# Patient Record
Sex: MALE
[De-identification: names, ages, dates, MRNs, and addresses within clinical notes are randomized per-mention and may not be internally consistent; named-entity substitution may affect disease eponyms.]

---

## 2019-03-27 ENCOUNTER — HOSPITAL ENCOUNTER (EMERGENCY)
Dept: HOSPITAL 97 - ER | Age: 24
Discharge: HOME | End: 2019-03-27
Payer: SELF-PAY

## 2019-03-27 VITALS — SYSTOLIC BLOOD PRESSURE: 127 MMHG | DIASTOLIC BLOOD PRESSURE: 88 MMHG | OXYGEN SATURATION: 99 %

## 2019-03-27 VITALS — TEMPERATURE: 98 F

## 2019-03-27 DIAGNOSIS — K62.5: ICD-10-CM

## 2019-03-27 DIAGNOSIS — F17.210: ICD-10-CM

## 2019-03-27 DIAGNOSIS — R10.9: Primary | ICD-10-CM

## 2019-03-27 LAB
ALBUMIN SERPL BCP-MCNC: 3.8 G/DL (ref 3.4–5)
ALP SERPL-CCNC: 77 U/L (ref 45–117)
ALT SERPL W P-5'-P-CCNC: 69 U/L (ref 12–78)
AST SERPL W P-5'-P-CCNC: 67 U/L (ref 15–37)
BUN BLD-MCNC: 14 MG/DL (ref 7–18)
GLUCOSE SERPLBLD-MCNC: 120 MG/DL (ref 74–106)
HCT VFR BLD CALC: 43.3 % (ref 39.6–49)
LIPASE SERPL-CCNC: 80 U/L (ref 73–393)
LYMPHOCYTES # SPEC AUTO: 1.6 K/UL (ref 0.7–4.9)
PMV BLD: 8.6 FL (ref 7.6–11.3)
POTASSIUM SERPL-SCNC: 3.9 MMOL/L (ref 3.5–5.1)
RBC # BLD: 4.74 M/UL (ref 4.33–5.43)

## 2019-03-27 PROCEDURE — 36415 COLL VENOUS BLD VENIPUNCTURE: CPT

## 2019-03-27 PROCEDURE — 80048 BASIC METABOLIC PNL TOTAL CA: CPT

## 2019-03-27 PROCEDURE — 99283 EMERGENCY DEPT VISIT LOW MDM: CPT

## 2019-03-27 PROCEDURE — 85025 COMPLETE CBC W/AUTO DIFF WBC: CPT

## 2019-03-27 PROCEDURE — 83690 ASSAY OF LIPASE: CPT

## 2019-03-27 PROCEDURE — 80076 HEPATIC FUNCTION PANEL: CPT

## 2019-03-27 PROCEDURE — 96360 HYDRATION IV INFUSION INIT: CPT

## 2019-03-27 NOTE — ER
Nurse's Notes                                                                                     

 [facility]                                                                 

Name: Edgardo Gardner Jr                                                                            

Age: 24 yrs                                                                                       

Sex: Male                                                                                         

: 1995                                                                                   

MRN: [id number]                                                                                   

Arrival Date: 2019                                                                          

Time: 07:46                                                                                       

Account#: [id number]                                                                            

Bed 6                                                                                             

Private MD:                                                                                       

Diagnosis: Unspecified abdominal pain                                                             

                                                                                                  

Presentation:                                                                                     

                                                                                             

07:53 Presenting complaint: Patient states: intermittent abd pain, cough, N/V and red blood   ss  

      in stool x 6 months that is not going away, but getting worse. Pt reports he is a daily     

      drinker, but has been trying to quit. Transition of care: patient was not received from     

      another setting of care. Onset of symptoms is unknown. Risk Assessment: Do you want to      

      hurt yourself or someone else? Patient reports no desire to harm self or others.            

      Initial Sepsis Screen: Does the patient meet any 2 criteria? No. Patient's initial          

      sepsis screen is negative. Does the patient have a suspected source of infection? No.       

      Patient's initial sepsis screen is negative. Care prior to arrival: None.                   

07:53 Method Of Arrival: Ambulatory                                                           ss  

07:53 Acuity: ANA 3                                                                           ss  

                                                                                                  

Historical:                                                                                       

- Allergies:                                                                                      

07:55 No Known Allergies;                                                                     ss  

- Home Meds:                                                                                      

07:55 None [Active];                                                                          ss  

- PMHx:                                                                                           

07:55 None;                                                                                   ss  

- PSHx:                                                                                           

07:55 R leg repair;                                                                           ss  

                                                                                                  

- Immunization history:: Adult Immunizations unknown.                                             

- Social history:: Smoking status: Patient uses tobacco products, smokes one-half pack            

  cigarettes per day, Patient uses alcohol, on a daily basis. "It was liquor, but it's            

  at least a 6 pack a day".                                                                       

- Ebola Screening: : Patient denies exposure to infectious person Patient denies travel           

  to an Ebola-affected area in the 21 days before illness onset.                                  

                                                                                                  

                                                                                                  

Screenin:10 Abuse screen: Denies threats or abuse. Denies injuries from another. Nutritional        ph  

      screening: No deficits noted. Tuberculosis screening: No symptoms or risk factors           

      identified. Fall Risk None identified.                                                      

                                                                                                  

Assessment:                                                                                       

08:00 General: Appears in no apparent distress. comfortable, obese, well groomed, Behavior is ph  

      calm, cooperative, appropriate for age. Pain: Complains of pain in abdomen. Neuro:          

      Level of Consciousness is awake, alert, obeys commands, Oriented to person, place,          

      time, situation. Cardiovascular: Denies chest pain, lightheadedness, shortness of           

      breath, Capillary refill < 3 seconds in bilateral fingers Patient's skin is warm and        

      dry. Respiratory: Airway is patent Respiratory effort is even, unlabored, Respiratory       

      pattern is regular, symmetrical. GI: Abdomen is round non-distended, Bowel sounds           

      present X 4 quads. Abd is soft and non tender X 4 quads. Reports lower abdominal pain,      

      upper abdominal pain, rectal bleeding, nausea, vomiting, Patient currently denies           

      constipation, diarrhea. Derm: Skin is intact, is healthy with good turgor, Skin is          

      pink, warm \T\ dry. Musculoskeletal: Circulation, motion, and sensation intact. Range of    

      motion: intact in all extremities.                                                          

09:30 Reassessment: Patient appears in no apparent distress at this time. Patient and/or      ph  

      family updated on plan of care and expected duration. Pain level reassessed. Patient is     

      alert, oriented x 3, equal unlabored respirations, skin warm/dry/pink. Pt d/c home w/       

      SO.                                                                                         

                                                                                                  

Vital Signs:                                                                                      

07:55  / 96; Pulse 82; Resp 18; Temp 98.0(TE); Pulse Ox 98% on R/A; Weight 108.86 kg;   ss  

      Height 6 ft. 0 in. (182.88 cm); Pain 0/10;                                                  

09:37  / 88; Pulse 78; Resp 18; Temp 98.0; Pulse Ox 99% on R/A;                         ph  

07:55 Body Mass Index 32.55 (108.86 kg, 182.88 cm)                                              

                                                                                                  

ED Course:                                                                                        

07:46 Patient arrived in ED.                                                                  tw3 

07:49 Les Raines NP is PHCP.                                                           pm1 

07:49 Lew Maldonado MD is Attending Physician.                                                    pm1 

07:54 Triage completed.                                                                       ss  

07:55 Arm band placed on right wrist.                                                         ss  

08:01 Inserted saline lock: 20 gauge in right antecubital area, using aseptic technique.      pc1 

08:10 Lesli Dietz, RN is Primary Nurse.                                                    ph  

08:11 Patient has correct armband on for positive identification. Bed in low position. Call   ph  

      light in reach. Side rails up X 1. Pulse ox on. NIBP on.                                    

08:26 Raul Plunkett MD is Attending Physician.                                             pm1 

09:37 No provider procedures requiring assistance completed. IV discontinued, intact,         ph  

      bleeding controlled, No redness/swelling at site. Pressure dressing applied.                

                                                                                                  

Administered Medications:                                                                         

08:12 Drug: NS 0.9% 1000 ml Route: IV; Rate: 1000 ml; Site: right antecubital;                ph  

09:25 Follow up: Response: No adverse reaction; IV Status: Completed infusion                 ph  

                                                                                                  

                                                                                                  

Outcome:                                                                                          

09:20 Discharge ordered by MD.                                                                pm1 

09:37 Discharged to home ambulatory, with significant other.                                  ph  

09:37 Condition: good                                                                             

09:37 Discharge instructions given to patient, Instructed on discharge instructions, follow       

      up and referral plans. Demonstrated understanding of instructions, follow-up care.          

09:38 Patient left the ED.                                                                    ph  

                                                                                                  

Signatures:                                                                                       

Magalys Fletcher RN                      RN                                                      

Lesli Dietz RN                      RN   ph                                                   

Les Raines, JOY                    NP   pm1                                                  

Agnes Flowers                                    3                                                  

Cantu, Patrick                               pc1                                                  

                                                                                                  

**************************************************************************************************

## 2019-03-27 NOTE — EDPHYS
Physician Documentation                                                                           

 [facility]                                                                 

Name: Edgardo Gardner Jr                                                                            

Age: 24 yrs                                                                                       

Sex: Male                                                                                         

: 1995                                                                                   

MRN: [id number]                                                                                   

Arrival Date: 2019                                                                          

Time: 07:46                                                                                       

Account#: [id number]                                                                            

Bed 6                                                                                             

Private MD:                                                                                       

ED Physician Raul Plunkett                                                                      

HPI:                                                                                              

                                                                                             

08:00 This 24 yrs old  Male presents to ER via Ambulatory with complaints of          pm1 

      Abdominal Pain.                                                                             

08:00 The patient presents with abdominal pain that is diffuse. Onset: The symptoms/episode   pm1 

      began/occurred 6 month(s) ago. The symptoms do not radiate. Associated signs and            

      symptoms: Pertinent positives: blood in stools, Pertinent negatives: nausea, vomiting,      

      and diarrhea. The symptoms are described as achy. Modifying factors: The symptoms are       

      alleviated by nothing, the symptoms are aggravated by nothing. Severity of pain: in the     

      emergency department the pain is unchanged. The patient has not recently seen a             

      physician. Patient with abdominal pain for 6 months. Reports occasional bright red          

      blood in his stools.                                                                        

                                                                                                  

Historical:                                                                                       

- Allergies:                                                                                      

07:55 No Known Allergies;                                                                     ss  

- Home Meds:                                                                                      

07:55 None [Active];                                                                          ss  

- PMHx:                                                                                           

07:55 None;                                                                                   ss  

- PSHx:                                                                                           

07:55 R leg repair;                                                                           ss  

                                                                                                  

- Immunization history:: Adult Immunizations unknown.                                             

- Social history:: Smoking status: Patient uses tobacco products, smokes one-half pack            

  cigarettes per day, Patient uses alcohol, on a daily basis. "It was liquor, but it's            

  at least a 6 pack a day".                                                                       

- Ebola Screening: : Patient denies exposure to infectious person Patient denies travel           

  to an Ebola-affected area in the 21 days before illness onset.                                  

                                                                                                  

                                                                                                  

ROS:                                                                                              

08:00 Constitutional: Negative for fever, chills, and weight loss, Eyes: Negative for injury, pm1 

      pain, redness, and discharge, ENT: Negative for injury, pain, and discharge, Neck:          

      Negative for injury, pain, and swelling, Cardiovascular: Negative for chest pain,           

      palpitations, and edema, Respiratory: Negative for shortness of breath, cough,              

      wheezing, and pleuritic chest pain.                                                         

08:00 Back: Negative for injury and pain, : Negative for injury, bleeding, discharge, and       

      swelling, MS/Extremity: Negative for injury and deformity, Skin: Negative for injury,       

      rash, and discoloration, Neuro: Negative for headache, weakness, numbness, tingling,        

      and seizure.                                                                                

08:00 Abdomen/GI: Positive for abdominal pain, rectal bleeding, Negative for nausea,              

      vomiting, and diarrhea, constipation.                                                       

                                                                                                  

Exam:                                                                                             

08:00 Constitutional:  This is a well developed, well nourished patient who is awake, alert,  pm1 

      and in no acute distress. Head/Face:  Normocephalic, atraumatic. Eyes:  Pupils equal        

      round and reactive to light, extra-ocular motions intact.  Lids and lashes normal.          

      Conjunctiva and sclera are non-icteric and not injected.  Cornea within normal limits.      

      Periorbital areas with no swelling, redness, or edema. ENT:  Nares patent. No nasal         

      discharge, no septal abnormalities noted.  Tympanic membranes are normal and external       

      auditory canals are clear.  Oropharynx with no redness, swelling, or masses, exudates,      

      or evidence of obstruction, uvula midline.  Mucous membranes moist. Neck:  Trachea          

      midline, no thyromegaly or masses palpated, and no cervical lymphadenopathy.  Supple,       

      full range of motion without nuchal rigidity, or vertebral point tenderness.  No            

      Meningismus. Chest/axilla:  Normal chest wall appearance and motion.  Nontender with no     

      deformity.  No lesions are appreciated. Cardiovascular:  Regular rate and rhythm with a     

      normal S1 and S2.  No gallops, murmurs, or rubs.  Normal PMI, no JVD.  No pulse             

      deficits. Respiratory:  Lungs have equal breath sounds bilaterally, clear to                

      auscultation and percussion.  No rales, rhonchi or wheezes noted.  No increased work of     

      breathing, no retractions or nasal flaring.                                                 

08:00 Back:  No spinal tenderness.  No costovertebral tenderness.  Full range of motion.          

      Skin:  Warm, dry with normal turgor.  Normal color with no rashes, no lesions, and no       

      evidence of cellulitis. MS/ Extremity:  Pulses equal, no cyanosis.  Neurovascular           

      intact.  Full, normal range of motion.                                                      

08:00 Abdomen/GI: Inspection: abdomen appears normal, Bowel sounds: normal, Palpation:            

      abdomen is soft and non-tender, in all quadrants, mass, is not appreciated, rebound         

      tenderness, is not appreciated.                                                             

08:00 Neuro: Orientation: is normal, Motor: is normal, moves all fours.                           

09:16 Abdomen/GI: Rectal exam: Patient refused examination.                                   pm1 

                                                                                                  

Vital Signs:                                                                                      

07:55  / 96; Pulse 82; Resp 18; Temp 98.0(TE); Pulse Ox 98% on R/A; Weight 108.86 kg;   ss  

      Height 6 ft. 0 in. (182.88 cm); Pain 0/10;                                                  

09:37  / 88; Pulse 78; Resp 18; Temp 98.0; Pulse Ox 99% on R/A;                         ph  

07:55 Body Mass Index 32.55 (108.86 kg, 182.88 cm)                                            ss  

                                                                                                  

MDM:                                                                                              

07:54 Patient medically screened.                                                             pm1 

09:16 Data reviewed: vital signs. Data interpreted: Pulse oximetry: on room air is 98 %.      pm1 

      Interpretation: normal.                                                                     

09:16 ED course: Patient did not want examination of rectum for complaint of some occasional  pm1 

      bright red blood in his stool for the past 6 months.                                        

09:16 Counseling: I had a detailed discussion with the patient and/or guardian regarding: the pm1 

      historical points, exam findings, and any diagnostic results supporting the                 

      discharge/admit diagnosis, lab results, the need for outpatient follow up, to return to     

      the emergency department if symptoms worsen or persist or if there are any questions or     

      concerns that arise at home, Drinking alcohol cessation.                                    

                                                                                                  

                                                                                             

07:56 Order name: Basic Metabolic Panel; Complete Time: 09:02                                 pm1 

                                                                                             

07:56 Order name: CBC with Diff; Complete Time: 08:24                                         pm1 

                                                                                             

07:56 Order name: Creatinine for Radiology; Complete Time: 08:36                              pm1 

                                                                                             

07:56 Order name: Hepatic Function; Complete Time: 09:02                                      pm1 

                                                                                             

07:56 Order name: Lipase; Complete Time: 09:02                                                pm1 

                                                                                             

07:56 Order name: IV Saline Lock; Complete Time: 08:11                                        pm1 

                                                                                             

07:56 Order name: Labs collected and sent; Complete Time: 08:12                               pm1 

                                                                                                  

Administered Medications:                                                                         

08:12 Drug: NS 0.9% 1000 ml Route: IV; Rate: 1000 ml; Site: right antecubital;                ph  

09:25 Follow up: Response: No adverse reaction; IV Status: Completed infusion                 ph  

                                                                                                  

                                                                                                  

Disposition:                                                                                      

                                                                                             

06:51 Co-signature as Attending Physician, Raul Plunkett MD I agree with the assessment and  wa  

      plan of care.                                                                               

                                                                                                  

Disposition:                                                                                      

19 09:20 Discharged to Home. Impression: Unspecified abdominal pain.                        

- Condition is Stable.                                                                            

- Discharge Instructions: Abdominal Pain, Adult.                                                  

                                                                                                  

- Medication Reconciliation Form, Thank You Letter, Antibiotic Education, Prescription            

  Opioid Use form.                                                                                

- Follow up: Emergency Department; When: As needed; Reason: Worsening of condition.               

  Follow up: Private Physician; When: 2 - 3 days; Reason: Recheck today's complaints,             

  Continuance of care, Re-evaluation by your physician.                                           

- Problem is new.                                                                                 

- Symptoms have improved.                                                                         

                                                                                                  

                                                                                                  

                                                                                                  

Signatures:                                                                                       

Dispatcher [employer]                           EDMS                                                 

Magalys Fletcher RN                      RN   ss                                                   

Lesli Dietz RN                      RN   ph                                                   

Les Raines, NP                    NP   pm1                                                  

Raul Plunkett MD MD wa                                                   

                                                                                                  

Corrections: (The following items were deleted from the chart)                                    

                                                                                             

09:38 09:20 2019 09:20 Discharged to Home. Impression: Unspecified abdominal pain.      ph  

      Condition is Stable. Forms are Medication Reconciliation Form, Thank You Letter,            

      Antibiotic Education, Prescription Opioid Use. Follow up: Emergency Department; When:       

      As needed; Reason: Worsening of condition. Follow up: Private Physician; When: 2 - 3        

      days; Reason: Recheck today's complaints, Continuance of care, Re-evaluation by your        

      physician. Problem is new. Symptoms have improved. pm1                                      

                                                                                                  

**************************************************************************************************

## 2025-04-13 ENCOUNTER — HOSPITAL ENCOUNTER (EMERGENCY)
Dept: HOSPITAL 97 - ER | Age: 30
Discharge: HOME | End: 2025-04-13
Payer: SELF-PAY

## 2025-04-13 VITALS — DIASTOLIC BLOOD PRESSURE: 83 MMHG | OXYGEN SATURATION: 100 % | SYSTOLIC BLOOD PRESSURE: 132 MMHG

## 2025-04-13 DIAGNOSIS — F41.9: Primary | ICD-10-CM

## 2025-04-13 LAB
ALBUMIN SERPL BCP-MCNC: 3.6 G/DL (ref 3.4–5)
ALBUMIN/GLOB SERPL: 0.9 {RATIO} (ref 1.1–1.8)
ANION GAP SERPL CALC-SCNC: 10.4 MEQ/L (ref 5–15)
GLOBULIN SER CALC-MCNC: 3.8 G/DL (ref 2.3–3.5)
HCT VFR BLD CALC: 41.7 % (ref 39.6–49)
HGB BLD-MCNC: 14.1 G/DL (ref 13.6–17.9)
LYMPHOCYTES # SPEC AUTO: 3.5 K/UL (ref 0.7–4.9)
MCH RBC QN AUTO: 31.1 PG (ref 27–35)
MCHC RBC AUTO-ENTMCNC: 33.9 G/DL (ref 32–36)
MCV RBC: 91.9 FL (ref 80–100)
NRBC BLD AUTO-RTO: 0.1 % (ref 0–0)
PMV BLD: 9.3 FL (ref 7.6–11.3)
POTASSIUM SERPL-SCNC: 3.4 MEQ/L (ref 3.5–5.1)
RBC # BLD: 4.54 M/UL (ref 4.33–5.43)

## 2025-04-13 PROCEDURE — 85025 COMPLETE CBC W/AUTO DIFF WBC: CPT

## 2025-04-13 PROCEDURE — 71045 X-RAY EXAM CHEST 1 VIEW: CPT

## 2025-04-13 PROCEDURE — 96361 HYDRATE IV INFUSION ADD-ON: CPT

## 2025-04-13 PROCEDURE — 99285 EMERGENCY DEPT VISIT HI MDM: CPT

## 2025-04-13 PROCEDURE — 93005 ELECTROCARDIOGRAM TRACING: CPT

## 2025-04-13 PROCEDURE — 96374 THER/PROPH/DIAG INJ IV PUSH: CPT

## 2025-04-13 PROCEDURE — 80053 COMPREHEN METABOLIC PANEL: CPT

## 2025-04-13 PROCEDURE — 83690 ASSAY OF LIPASE: CPT

## 2025-04-13 PROCEDURE — 84484 ASSAY OF TROPONIN QUANT: CPT

## 2025-04-13 PROCEDURE — 36415 COLL VENOUS BLD VENIPUNCTURE: CPT
